# Patient Record
Sex: FEMALE | Race: WHITE | NOT HISPANIC OR LATINO | URBAN - METROPOLITAN AREA
[De-identification: names, ages, dates, MRNs, and addresses within clinical notes are randomized per-mention and may not be internally consistent; named-entity substitution may affect disease eponyms.]

---

## 2017-01-20 ENCOUNTER — OFFICE (OUTPATIENT)
Dept: URBAN - METROPOLITAN AREA CLINIC 11 | Facility: CLINIC | Age: 72
End: 2017-01-20
Payer: MEDICARE

## 2017-01-20 VITALS
DIASTOLIC BLOOD PRESSURE: 83 MMHG | WEIGHT: 158 LBS | SYSTOLIC BLOOD PRESSURE: 147 MMHG | HEIGHT: 62 IN | HEART RATE: 95 BPM

## 2017-01-20 DIAGNOSIS — K58.0 IRRITABLE BOWEL SYNDROME WITH DIARRHEA: ICD-10-CM

## 2017-01-20 PROCEDURE — 99213 OFFICE O/P EST LOW 20 MIN: CPT | Performed by: INTERNAL MEDICINE

## 2017-01-20 PROCEDURE — G8427 DOCREV CUR MEDS BY ELIG CLIN: HCPCS | Performed by: INTERNAL MEDICINE

## 2017-01-20 RX ORDER — CHOLESTYRAMINE 4 G/5G
POWDER, FOR SUSPENSION ORAL
Qty: 90 | Refills: 3 | Status: COMPLETED
Start: 2016-10-05 | End: 2023-10-01

## 2017-01-20 NOTE — SERVICEHPINOTES
She stated that "I am doing great".  She has noted that on the cholestyramine that her diarrhea has stopped aside from three episodes that she related to a stress issue.  She has been able to be consistent wit [facility] emeds.  She has had some issues with constipationn ih the medication but stated that it has not been an issue given the overall improvement.  She has then cut back to 1/2 packet a day.  She has not had bleeding or other acute issues.  She has also not ahd further issues with diverticulosis.She has been spacing her cholestyramine about two hours after the thryoid meds and other meds.  She has seen Dr. Moore and due to age and other medical issues, it was decided to wait until another issue with diverticulitis.

## 2017-01-20 NOTE — SERVICENOTES
She has been doing quite well on the cholestyramine.  We discussed its longterm use and need to keep it separate from other meds.  We discussed that overtime we may be able to stop it.  I would see her back in a year. She has not had issues with diverticulitis and is to call if there are difficulties.

## 2017-07-06 ENCOUNTER — OFFICE (OUTPATIENT)
Dept: URBAN - METROPOLITAN AREA CLINIC 11 | Facility: CLINIC | Age: 72
End: 2017-07-06
Payer: MEDICARE

## 2017-07-06 VITALS
HEIGHT: 62 IN | WEIGHT: 165 LBS | DIASTOLIC BLOOD PRESSURE: 95 MMHG | HEART RATE: 86 BPM | SYSTOLIC BLOOD PRESSURE: 147 MMHG

## 2017-07-06 DIAGNOSIS — E78.70 DISORDER OF BILE ACID AND CHOLESTEROL METABOLISM, UNSPECIFIE: ICD-10-CM

## 2017-07-06 DIAGNOSIS — R19.7 DIARRHEA, UNSPECIFIED: ICD-10-CM

## 2017-07-06 PROCEDURE — G8427 DOCREV CUR MEDS BY ELIG CLIN: HCPCS | Performed by: INTERNAL MEDICINE

## 2017-07-06 PROCEDURE — 99213 OFFICE O/P EST LOW 20 MIN: CPT | Performed by: INTERNAL MEDICINE

## 2017-07-06 RX ORDER — PANCRELIPASE 24000; 76000; 120000 [USP'U]/1; [USP'U]/1; [USP'U]/1
CAPSULE, DELAYED RELEASE PELLETS ORAL
Qty: 270 | Refills: 3 | Status: COMPLETED
Start: 2017-07-06 | End: 2023-10-01

## 2017-07-06 NOTE — SERVICENOTES
We discussed her diarrhea issues and prior difficulties with bile salt malabsorption and prior improvement with the cholestyramine.  We discussed her questions about TV adds and that viberzi is contraindictated with her diarrhea.  We discussed the retrial of the binders but she wanted a different option.  Given the history of pancreatitis, we can try a pancreatic enzyme replacement such as Creon 24s 1 po before breakfast/dinner.

## 2017-07-06 NOTE — SERVICEHPINOTES
She stated that she had been on the cholestryamine and was doig well until about 6 weeks ago.  The her stools became solid hard pellets.  She passed a hard stool and had some bleeding with it.  She stopped the binders and then would have diarrhea.  She tried to start back the cholestyramine at a 1/4 dose and it would again cause constipation. She has since stopped the binders and has restarted having diarrhea again.  The diarrhea has been variable with stools about once a day to once every other day.  She has had some leakage with the diarrhea which has caused difficulties with accidents. She has noted ath eating hard cheeses and crackers that the stools are better.  She has not had further bleeidng issues.  She does not have diarrhea if she does not eat.  Sometimes she will have diarrhea right after eating. She had normal colon bx in 2015 for the diarrhea eval. She has had her gall bladder out and has had a history of pancreatitis. She has had a history of multile adenomas and villous adenomas with her last colonscopy in 2015.  She is due for her f/u colon in the Fall of 2018.

## 2017-10-10 ENCOUNTER — OFFICE (OUTPATIENT)
Dept: URBAN - METROPOLITAN AREA CLINIC 11 | Facility: CLINIC | Age: 72
End: 2017-10-10
Payer: MEDICARE

## 2017-10-10 VITALS
HEIGHT: 62 IN | DIASTOLIC BLOOD PRESSURE: 102 MMHG | WEIGHT: 166 LBS | HEART RATE: 110 BPM | RESPIRATION RATE: 16 BRPM | SYSTOLIC BLOOD PRESSURE: 152 MMHG

## 2017-10-10 DIAGNOSIS — R74.0 NONSPECIFIC ELEVATION OF LEVELS OF TRANSAMINASE AND LACTIC A: ICD-10-CM

## 2017-10-10 DIAGNOSIS — E78.70 DISORDER OF BILE ACID AND CHOLESTEROL METABOLISM, UNSPECIFIE: ICD-10-CM

## 2017-10-10 LAB
ACTIN (SMOOTH MUSCLE) ANTIBODY: (no result) UNITS
ANTINUCLEAR ANTIBODIES, IFA: (no result)
HEPATIC FUNCTION PANEL (7): ALBUMIN, SERUM: (no result)
HEPATIC FUNCTION PANEL (7): ALKALINE PHOSPHATASE, S: (no result)
HEPATIC FUNCTION PANEL (7): ALT (SGPT): (no result)
HEPATIC FUNCTION PANEL (7): AST (SGOT): (no result)
HEPATIC FUNCTION PANEL (7): BILIRUBIN, DIRECT: (no result)
HEPATIC FUNCTION PANEL (7): BILIRUBIN, TOTAL: (no result)
HEPATIC FUNCTION PANEL (7): PROTEIN, TOTAL, SERUM: (no result) G/DL
MITOCHONDRIAL (M2) ANTIBODY: (no result) UNITS
REQUEST PROBLEM: (no result)

## 2017-10-10 PROCEDURE — 99213 OFFICE O/P EST LOW 20 MIN: CPT | Performed by: INTERNAL MEDICINE

## 2017-10-10 PROCEDURE — G8427 DOCREV CUR MEDS BY ELIG CLIN: HCPCS | Performed by: INTERNAL MEDICINE

## 2017-10-10 RX ORDER — PANCRELIPASE 24000; 76000; 120000 [USP'U]/1; [USP'U]/1; [USP'U]/1
CAPSULE, DELAYED RELEASE PELLETS ORAL
Qty: 270 | Refills: 3 | Status: COMPLETED
Start: 2017-07-06 | End: 2023-10-01

## 2017-10-10 NOTE — SERVICENOTES
We discussed her chronic diarrhea and improvement on Creon.  I would increase it to TID as discussed.  We reviewd her LFTs and discussed her etoh use (overall minimal).  I will do f/u LFTS and also screening for PBC/Autoimmune hepatitis.

## 2017-10-10 NOTE — SERVICEHPINOTES
"I'm doing ok."  She stated that had been on the Creon and had improved until her trip to [place].  She has been "awful at eating" while there and was eating fried seafoods.  WIth it she had diarrhea but it was better than with the cholestyramine which also caused some bloating.  She stated that one the Creon and on the lower fat diet she is having about 3-4 soft to formed stools.  She has been taking it 1 prior to meals twice a day. She stated that her LFTS were elevated with Dr. Dietz.   We reviewed her labs in [facility] over the past year. Her ALT has been mild increased to 70 from 50s.  Her AST has been overall normal.  The GGT was mildly elevated in the 120s and Alk phos was mildly elevated as well.

## 2017-10-17 ENCOUNTER — OFFICE (OUTPATIENT)
Dept: URBAN - METROPOLITAN AREA CLINIC 11 | Facility: CLINIC | Age: 72
End: 2017-10-17

## 2017-10-17 DIAGNOSIS — R74.0 NONSPECIFIC ELEVATION OF LEVELS OF TRANSAMINASE AND LACTIC A: ICD-10-CM

## 2017-10-17 LAB
ACTIN (SMOOTH MUSCLE) ANTIBODY: 7 UNITS (ref 0–19)
ANTINUCLEAR ANTIBODIES, IFA: NEGATIVE
HEPATIC FUNCTION PANEL (7): ALBUMIN, SERUM: 4.4 G/DL (ref 3.5–4.8)
HEPATIC FUNCTION PANEL (7): ALKALINE PHOSPHATASE, S: 97 IU/L (ref 39–117)
HEPATIC FUNCTION PANEL (7): ALT (SGPT): 28 IU/L (ref 0–32)
HEPATIC FUNCTION PANEL (7): AST (SGOT): 21 IU/L (ref 0–40)
HEPATIC FUNCTION PANEL (7): BILIRUBIN, DIRECT: 0.13 MG/DL (ref 0–0.4)
HEPATIC FUNCTION PANEL (7): BILIRUBIN, TOTAL: 0.4 MG/DL (ref 0–1.2)
HEPATIC FUNCTION PANEL (7): PROTEIN, TOTAL, SERUM: 6.4 G/DL (ref 6–8.5)
MITOCHONDRIAL (M2) ANTIBODY: 4.5 UNITS (ref 0–20)

## 2018-01-09 ENCOUNTER — OFFICE (OUTPATIENT)
Dept: URBAN - METROPOLITAN AREA CLINIC 11 | Facility: CLINIC | Age: 73
End: 2018-01-09
Payer: MEDICARE

## 2018-01-09 VITALS
HEART RATE: 92 BPM | DIASTOLIC BLOOD PRESSURE: 103 MMHG | HEIGHT: 62 IN | SYSTOLIC BLOOD PRESSURE: 177 MMHG | WEIGHT: 174 LBS

## 2018-01-09 DIAGNOSIS — E78.70 DISORDER OF BILE ACID AND CHOLESTEROL METABOLISM, UNSPECIFIE: ICD-10-CM

## 2018-01-09 DIAGNOSIS — K58.0 IRRITABLE BOWEL SYNDROME WITH DIARRHEA: ICD-10-CM

## 2018-01-09 PROCEDURE — G8427 DOCREV CUR MEDS BY ELIG CLIN: HCPCS | Performed by: INTERNAL MEDICINE

## 2018-01-09 PROCEDURE — 99213 OFFICE O/P EST LOW 20 MIN: CPT | Performed by: INTERNAL MEDICINE

## 2018-01-09 RX ORDER — COLESTIPOL HYDROCHLORIDE 1 G/1
TABLET ORAL
Qty: 180 | Refills: 3 | Status: COMPLETED
Start: 2018-01-09 | End: 2023-10-01

## 2018-01-09 RX ORDER — PANCRELIPASE 24000; 76000; 120000 [USP'U]/1; [USP'U]/1; [USP'U]/1
CAPSULE, DELAYED RELEASE PELLETS ORAL
Qty: 270 | Refills: 3 | Status: COMPLETED
Start: 2017-07-06 | End: 2023-10-01

## 2018-01-09 NOTE — SERVICEHPINOTES
She presents for f/u of her reportedly abnormal LFTS.  She had normal LFTS in October and normal serolgies.  She has not had sx/sx of hepatitis.  She has had her renal cyst drained since our last visit and has felt quite relieved about having it check and drained.She has been on her meds for her diarrhea but had still had issues.  She has had intermittent diarrhea with one of the more recent epidose after eating a hamburger and a few fries from [employer].  She had another episode after eating roast.  She is having about 3-5 stools a day.  The stools are soft and formed but at times liquid every other day or so or if she eats certain foods.  She has taken Imodium which may cause her stools to harden.  If she is traveling she will take an Imodium before leaving and then with eating.  She has been on the Creon. She is not certain that it is helping.   She has been on several IBS meds in the past including Robinul, dicyclomine, and Xifaxan.  She had some improvement a while back with cholestyramine but did not like taking it. BR

## 2018-01-09 NOTE — SERVICENOTES
We dsicussed her chronic diarrhea, prior testing, prior meds, and the trial again of cholestyramine but in tablet form.

## 2018-04-10 ENCOUNTER — OFFICE (OUTPATIENT)
Dept: URBAN - METROPOLITAN AREA CLINIC 11 | Facility: CLINIC | Age: 73
End: 2018-04-10

## 2018-04-10 VITALS
DIASTOLIC BLOOD PRESSURE: 89 MMHG | WEIGHT: 171 LBS | HEIGHT: 62 IN | SYSTOLIC BLOOD PRESSURE: 152 MMHG | HEART RATE: 95 BPM

## 2018-04-10 DIAGNOSIS — E78.70 DISORDER OF BILE ACID AND CHOLESTEROL METABOLISM, UNSPECIFIE: ICD-10-CM

## 2018-04-10 PROCEDURE — 99213 OFFICE O/P EST LOW 20 MIN: CPT | Performed by: INTERNAL MEDICINE

## 2018-04-10 NOTE — SERVICENOTES
She has been doing quite well on the lower carb diet and with the colestid.  She has really liked the improvement. We discussed the normalization of her stools and the need to space the bile binders from her other meds.  With her improvement, I would see her back yearly.

## 2018-04-10 NOTE — SERVICEHPINOTES
She presnets for f/u of her diarrhea.  She stated that on the Colestid and the lower carb diet her diarrhea has stopped.  She is having  a couple of stools a day.  She has has diarrhea twice after eating a "really greasy meal with everything fried".  Shehas not had issues with abdominal pain or n/v or other new issues.

## 2018-10-22 ENCOUNTER — OFFICE (OUTPATIENT)
Dept: URBAN - METROPOLITAN AREA CLINIC 11 | Facility: CLINIC | Age: 73
End: 2018-10-22

## 2018-10-22 VITALS
HEART RATE: 99 BPM | SYSTOLIC BLOOD PRESSURE: 120 MMHG | DIASTOLIC BLOOD PRESSURE: 69 MMHG | WEIGHT: 175 LBS | HEIGHT: 62 IN

## 2018-10-22 DIAGNOSIS — K57.30 DIVERTICULOSIS OF LARGE INTESTINE WITHOUT PERFORATION OR ABS: ICD-10-CM

## 2018-10-22 DIAGNOSIS — Z86.010 PERSONAL HISTORY OF COLONIC POLYPS: ICD-10-CM

## 2018-10-22 DIAGNOSIS — R19.7 DIARRHEA, UNSPECIFIED: ICD-10-CM

## 2018-10-22 PROCEDURE — 99214 OFFICE O/P EST MOD 30 MIN: CPT | Performed by: INTERNAL MEDICINE

## 2018-10-22 RX ORDER — SODIUM SULFATE, POTASSIUM SULFATE, MAGNESIUM SULFATE 17.5; 3.13; 1.6 G/ML; G/ML; G/ML
SOLUTION, CONCENTRATE ORAL
Qty: 1 | Refills: 0 | Status: COMPLETED
Start: 2018-10-22 | End: 2023-10-01

## 2018-10-22 NOTE — SERVICEHPINOTES
She stated that she had diverticulitis in August noted on a CT.  She was treated with antibiotics.  About 4 weeks later she had diarrhea with some abdominal pain while on a cruise in [place].  She was treated again with antibiotics.  She has after that she had "strep throat" and was treated again with antibiotics. She has not had diverticulitis since August.  She has had diarrhea "all of the time".  She has been on the colestid and on two tablets daily she had "balls of stools" with cramps and decreased it to once daily and her diarrhea increased again. She was on the 2 tablets for about 2 moths.She had a 12mm adenoma removed in 2015 and is due for her f/u colon.

## 2018-10-22 NOTE — SERVICENOTES
We discussed her diarrhea and improvement on the binders.  However she has dropped the dose due to the harder stools.  Prior to changing to cholestyramine powder, in that she is due for her f/u colon with her polyp hx, I would do random colon bx at the time as well.  We discussed her diverticulitis, risks of recurrent issues, particular risks of scopes, and that I would wait another 3-4 weeks to do the colonoscopy.  She agreed to proceed.

## 2018-11-12 ENCOUNTER — AMBULATORY SURGICAL CENTER (OUTPATIENT)
Dept: URBAN - METROPOLITAN AREA SURGERY 3 | Facility: SURGERY | Age: 73
End: 2018-11-12

## 2018-11-12 ENCOUNTER — OFFICE (OUTPATIENT)
Dept: URBAN - METROPOLITAN AREA PATHOLOGY 22 | Facility: PATHOLOGY | Age: 73
End: 2018-11-12

## 2018-11-12 ENCOUNTER — AMBULATORY SURGICAL CENTER (OUTPATIENT)
Dept: URBAN - METROPOLITAN AREA SURGERY 3 | Facility: SURGERY | Age: 73
End: 2018-11-12
Payer: MEDICARE

## 2018-11-12 VITALS
WEIGHT: 175 LBS | DIASTOLIC BLOOD PRESSURE: 74 MMHG | HEART RATE: 89 BPM | WEIGHT: 175 LBS | DIASTOLIC BLOOD PRESSURE: 74 MMHG | SYSTOLIC BLOOD PRESSURE: 150 MMHG | OXYGEN SATURATION: 98 % | HEART RATE: 89 BPM | OXYGEN SATURATION: 99 % | HEART RATE: 88 BPM | RESPIRATION RATE: 22 BRPM | HEIGHT: 62 IN | SYSTOLIC BLOOD PRESSURE: 137 MMHG | SYSTOLIC BLOOD PRESSURE: 137 MMHG | DIASTOLIC BLOOD PRESSURE: 97 MMHG | HEART RATE: 88 BPM | RESPIRATION RATE: 20 BRPM | TEMPERATURE: 98.2 F | OXYGEN SATURATION: 98 % | DIASTOLIC BLOOD PRESSURE: 71 MMHG | RESPIRATION RATE: 20 BRPM | OXYGEN SATURATION: 99 % | SYSTOLIC BLOOD PRESSURE: 150 MMHG | SYSTOLIC BLOOD PRESSURE: 152 MMHG | HEART RATE: 98 BPM | DIASTOLIC BLOOD PRESSURE: 73 MMHG | HEART RATE: 95 BPM | OXYGEN SATURATION: 97 % | HEART RATE: 100 BPM | HEIGHT: 62 IN | SYSTOLIC BLOOD PRESSURE: 138 MMHG | SYSTOLIC BLOOD PRESSURE: 154 MMHG | DIASTOLIC BLOOD PRESSURE: 71 MMHG | DIASTOLIC BLOOD PRESSURE: 73 MMHG | SYSTOLIC BLOOD PRESSURE: 152 MMHG | HEART RATE: 95 BPM | HEART RATE: 98 BPM | RESPIRATION RATE: 22 BRPM | TEMPERATURE: 98 F | TEMPERATURE: 98.2 F | RESPIRATION RATE: 19 BRPM | DIASTOLIC BLOOD PRESSURE: 97 MMHG | TEMPERATURE: 98 F | DIASTOLIC BLOOD PRESSURE: 70 MMHG | RESPIRATION RATE: 19 BRPM | DIASTOLIC BLOOD PRESSURE: 70 MMHG | OXYGEN SATURATION: 97 % | SYSTOLIC BLOOD PRESSURE: 138 MMHG | SYSTOLIC BLOOD PRESSURE: 154 MMHG | HEART RATE: 100 BPM

## 2018-11-12 DIAGNOSIS — Z86.010 PERSONAL HISTORY OF COLONIC POLYPS: ICD-10-CM

## 2018-11-12 DIAGNOSIS — D12.3 BENIGN NEOPLASM OF TRANSVERSE COLON: ICD-10-CM

## 2018-11-12 DIAGNOSIS — D12.2 BENIGN NEOPLASM OF ASCENDING COLON: ICD-10-CM

## 2018-11-12 DIAGNOSIS — D12.5 BENIGN NEOPLASM OF SIGMOID COLON: ICD-10-CM

## 2018-11-12 DIAGNOSIS — R19.7 DIARRHEA, UNSPECIFIED: ICD-10-CM

## 2018-11-12 DIAGNOSIS — D12.0 BENIGN NEOPLASM OF CECUM: ICD-10-CM

## 2018-11-12 DIAGNOSIS — K57.30 DIVERTICULOSIS OF LARGE INTESTINE WITHOUT PERFORATION OR ABS: ICD-10-CM

## 2018-11-12 PROCEDURE — G8907 PT DOC NO EVENTS ON DISCHARG: HCPCS | Performed by: INTERNAL MEDICINE

## 2018-11-12 PROCEDURE — 45380 COLONOSCOPY AND BIOPSY: CPT | Performed by: INTERNAL MEDICINE

## 2018-11-12 PROCEDURE — G8918 PT W/O PREOP ORDER IV AB PRO: HCPCS | Performed by: INTERNAL MEDICINE

## 2018-11-12 PROCEDURE — 88305 TISSUE EXAM BY PATHOLOGIST: CPT | Performed by: INTERNAL MEDICINE

## 2018-11-20 ENCOUNTER — OFFICE (OUTPATIENT)
Dept: URBAN - METROPOLITAN AREA CLINIC 11 | Facility: CLINIC | Age: 73
End: 2018-11-20

## 2018-11-20 VITALS — WEIGHT: 176 LBS | HEART RATE: 97 BPM | HEIGHT: 62 IN

## 2018-11-20 DIAGNOSIS — R19.7 DIARRHEA, UNSPECIFIED: ICD-10-CM

## 2018-11-20 PROCEDURE — 99213 OFFICE O/P EST LOW 20 MIN: CPT | Performed by: INTERNAL MEDICINE

## 2018-11-20 RX ORDER — RIFAXIMIN 550 MG/1
TABLET ORAL
Qty: 42 | Refills: 0 | Status: COMPLETED
Start: 2018-11-20 | End: 2023-10-01

## 2018-11-20 NOTE — SERVICENOTES
We discussed her colon polyps and three year f/u colon.  As to her diarrhea, I still suspect that this is related to bacterial overgrowth/IBS with some bile acid component.  We discussed the trial of Xifaxan with the Align.  She has not started her new BP meds and we discussed the need to be consistent with her meds.

## 2018-11-20 NOTE — SERVICEHPINOTES
She presents for f/u of her diarrhea. She stated that after her colon she had normal stools for about 5 days straight.  She was not certain why it had improved.  She had not changed her diet. She has been on Colestid which had stopped her diarrhea for about a year.  She has had normal bx in the past.  She has not been on xifaxan thought it has been rec twice. She recalled that she might have had a dumping syndrome diagnosis years ago with Dr. Kingsley.

## 2019-01-28 ENCOUNTER — OFFICE (OUTPATIENT)
Dept: URBAN - METROPOLITAN AREA CLINIC 11 | Facility: CLINIC | Age: 74
End: 2019-01-28

## 2019-01-28 VITALS
SYSTOLIC BLOOD PRESSURE: 198 MMHG | WEIGHT: 179 LBS | HEART RATE: 86 BPM | DIASTOLIC BLOOD PRESSURE: 103 MMHG | HEIGHT: 62 IN | SYSTOLIC BLOOD PRESSURE: 174 MMHG | DIASTOLIC BLOOD PRESSURE: 131 MMHG

## 2019-01-28 DIAGNOSIS — E66.9 OBESITY, UNSPECIFIED: ICD-10-CM

## 2019-01-28 DIAGNOSIS — R19.7 DIARRHEA, UNSPECIFIED: ICD-10-CM

## 2019-01-28 DIAGNOSIS — K58.0 IRRITABLE BOWEL SYNDROME WITH DIARRHEA: ICD-10-CM

## 2019-01-28 DIAGNOSIS — I10 ESSENTIAL (PRIMARY) HYPERTENSION: ICD-10-CM

## 2019-01-28 PROCEDURE — 99213 OFFICE O/P EST LOW 20 MIN: CPT | Performed by: INTERNAL MEDICINE

## 2019-01-28 NOTE — SERVICENOTES
Her IBS/bacterial overgrowth has improved nicely after the Xifaxan.  We dsicussed the use of the probiotics and stopping them after about a month.  We discussed her htn, sx/sx of htn urgency/emergency, and f/u with Dr. Dietz.  Her f/u BP was 160/98.

## 2019-01-28 NOTE — SERVICEHPINOTES
She presents for f/u of her IBS.  She ahd been on the Xifaxan and while on it all of her symptoms resolved. She stated that off of it she has only had two episode of diarrhea.  Once was after eggs and the other after "alot" of olives.  She has been doing quite well. She has really enjoyed the improvement.  She has been able to a broader variety of foods. She has a history of hypertension.  She had been on meds including an ACE.  She stated that she had been taking her pressures had home for a while.  It has been in the 1502-160s over 80s-90s.   She stated that she had been seeing Dr. Dietz and Dr. Rebolledo.  She has not had cp, sob, anginal sx or such.

## 2019-07-16 ENCOUNTER — OFFICE (OUTPATIENT)
Dept: URBAN - METROPOLITAN AREA CLINIC 11 | Facility: CLINIC | Age: 74
End: 2019-07-16
Payer: COMMERCIAL

## 2019-07-16 VITALS
HEIGHT: 62 IN | WEIGHT: 165 LBS | SYSTOLIC BLOOD PRESSURE: 124 MMHG | HEART RATE: 100 BPM | DIASTOLIC BLOOD PRESSURE: 87 MMHG

## 2019-07-16 DIAGNOSIS — K58.0 IRRITABLE BOWEL SYNDROME WITH DIARRHEA: ICD-10-CM

## 2019-07-16 DIAGNOSIS — E78.70 DISORDER OF BILE ACID AND CHOLESTEROL METABOLISM, UNSPECIFIE: ICD-10-CM

## 2019-07-16 PROCEDURE — 99213 OFFICE O/P EST LOW 20 MIN: CPT | Performed by: INTERNAL MEDICINE

## 2019-07-16 NOTE — SERVICEHPINOTES
She present for f/u of her IBS.  She has completed the Xifaxana and noted an improvement.  She has also started a Keto diet and has lost about 15 lbs.  On the diet she has not had issues with diarrhea, unless she has lobster and butter or greasier foods.  She has been able to stop the cholestyramine as well.  She has not had issues with bloating or abdominal pain.  Overall her stools are normal.

## 2019-07-16 NOTE — SERVICENOTES
She has been doing quite well on the keto diet and after the Xifaxan.  We discussed a lower fat diet with her hx of bile acid issues and a possible restart of the binders if she has issues.  With her improvement, I would see her back in 2021 for her f/u colon (hx of mutiple polyps).

## 2020-01-10 ENCOUNTER — OFFICE (OUTPATIENT)
Dept: URBAN - METROPOLITAN AREA CLINIC 11 | Facility: CLINIC | Age: 75
End: 2020-01-10

## 2020-01-21 ENCOUNTER — OFFICE (OUTPATIENT)
Dept: URBAN - METROPOLITAN AREA CLINIC 11 | Facility: CLINIC | Age: 75
End: 2020-01-21
Payer: COMMERCIAL

## 2020-01-21 VITALS
WEIGHT: 161 LBS | SYSTOLIC BLOOD PRESSURE: 141 MMHG | HEART RATE: 106 BPM | DIASTOLIC BLOOD PRESSURE: 92 MMHG | HEIGHT: 62 IN

## 2020-01-21 DIAGNOSIS — R10.30 LOWER ABDOMINAL PAIN, UNSPECIFIED: ICD-10-CM

## 2020-01-21 DIAGNOSIS — R10.813 RIGHT LOWER QUADRANT ABDOMINAL TENDERNESS: ICD-10-CM

## 2020-01-21 DIAGNOSIS — A09 INFECTIOUS GASTROENTERITIS AND COLITIS, UNSPECIFIED: ICD-10-CM

## 2020-01-21 DIAGNOSIS — R10.814 LEFT LOWER QUADRANT ABDOMINAL TENDERNESS: ICD-10-CM

## 2020-01-21 LAB
CBC, PLATELET, NO DIFFERENTIAL: HEMATOCRIT: 45.2 % (ref 34–46.6)
CBC, PLATELET, NO DIFFERENTIAL: HEMOGLOBIN: 14.8 G/DL (ref 11.1–15.9)
CBC, PLATELET, NO DIFFERENTIAL: MCH: 29 PG (ref 26.6–33)
CBC, PLATELET, NO DIFFERENTIAL: MCHC: 32.7 G/DL (ref 31.5–35.7)
CBC, PLATELET, NO DIFFERENTIAL: MCV: 89 FL (ref 79–97)
CBC, PLATELET, NO DIFFERENTIAL: PLATELETS: 281 X10E3/UL (ref 150–450)
CBC, PLATELET, NO DIFFERENTIAL: RBC: 5.11 X10E6/UL (ref 3.77–5.28)
CBC, PLATELET, NO DIFFERENTIAL: RDW: 13.5 % (ref 11.7–15.4)
CBC, PLATELET, NO DIFFERENTIAL: WBC: 5.8 X10E3/UL (ref 3.4–10.8)
COMP. METABOLIC PANEL (14): A/G RATIO: 2.3 — HIGH (ref 1.2–2.2)
COMP. METABOLIC PANEL (14): ALBUMIN: 4.6 G/DL (ref 3.7–4.7)
COMP. METABOLIC PANEL (14): ALKALINE PHOSPHATASE: 110 IU/L (ref 39–117)
COMP. METABOLIC PANEL (14): ALT (SGPT): 17 IU/L (ref 0–32)
COMP. METABOLIC PANEL (14): AST (SGOT): 14 IU/L (ref 0–40)
COMP. METABOLIC PANEL (14): BILIRUBIN, TOTAL: 0.3 MG/DL (ref 0–1.2)
COMP. METABOLIC PANEL (14): BUN/CREATININE RATIO: 22 (ref 12–28)
COMP. METABOLIC PANEL (14): BUN: 18 MG/DL (ref 8–27)
COMP. METABOLIC PANEL (14): CALCIUM: 10 MG/DL (ref 8.7–10.3)
COMP. METABOLIC PANEL (14): CARBON DIOXIDE, TOTAL: 23 MMOL/L (ref 20–29)
COMP. METABOLIC PANEL (14): CHLORIDE: 104 MMOL/L (ref 96–106)
COMP. METABOLIC PANEL (14): CREATININE: 0.83 MG/DL (ref 0.57–1)
COMP. METABOLIC PANEL (14): EGFR IF AFRICN AM: 79 ML/MIN/1.73 (ref 59–?)
COMP. METABOLIC PANEL (14): EGFR IF NONAFRICN AM: 69 ML/MIN/1.73 (ref 59–?)
COMP. METABOLIC PANEL (14): GLOBULIN, TOTAL: 2 G/DL (ref 1.5–4.5)
COMP. METABOLIC PANEL (14): GLUCOSE: 113 MG/DL — HIGH (ref 65–99)
COMP. METABOLIC PANEL (14): POTASSIUM: 5 MMOL/L (ref 3.5–5.2)
COMP. METABOLIC PANEL (14): PROTEIN, TOTAL: 6.6 G/DL (ref 6–8.5)
COMP. METABOLIC PANEL (14): SODIUM: 142 MMOL/L (ref 134–144)

## 2020-01-21 PROCEDURE — 99214 OFFICE O/P EST MOD 30 MIN: CPT | Performed by: INTERNAL MEDICINE

## 2020-01-21 RX ORDER — HYOSCYAMINE SULFATE 0.12 MG/1
0.38 TABLET, ORALLY DISINTEGRATING ORAL
Qty: 30 | Refills: 1 | Status: COMPLETED
Start: 2020-01-21 | End: 2023-10-01

## 2020-01-21 NOTE — SERVICENOTES
We discussed her recent diarrheal issue and that this sounds infectious.  We discussed potential sources including the egg nog and several infectious agents including salmonella, shigella, rotovirus, astrovirus, campylobacter, etc...  She has improved but is  on exam.  I would lie to do a CT to evaluated for diverticulitis/colitis, etc... I would hold on additional antibitoics for now.  With her hx of bile acid diarrhea, after this resolves, she may need to go back on binders.

## 2020-01-21 NOTE — SERVICEHPINOTES
She presents for evaluation of diarrhea with abdominal pain after New Years.  She had some home made egg nog and other New Years foods prior to the diarrhea.  She stated that for about 6 days that she had a low pelvic pain which was sharp. This has improved to an ache.  She has not had fevers but did have nausea with one episode of vomiting.  During this time she had runny stools wtih some mucus.  She had some bright blood once after a diarrhea episode. She was having about 3-5 stools a day during the diarrhea.  Her stools have been returning to normal. She has all most completed her antibiotics. She had thought that this was diverticulitis.  She had been on cholestyramine last year but was off of it from July until now.  She did notice some intermittent diarrhea after Christmas which was different than the diarrhea after New Years.

## 2020-02-14 ENCOUNTER — OFFICE (OUTPATIENT)
Dept: URBAN - METROPOLITAN AREA CLINIC 11 | Facility: CLINIC | Age: 75
End: 2020-02-14
Payer: COMMERCIAL

## 2020-02-14 VITALS
HEART RATE: 91 BPM | HEIGHT: 62 IN | WEIGHT: 165 LBS | SYSTOLIC BLOOD PRESSURE: 143 MMHG | DIASTOLIC BLOOD PRESSURE: 81 MMHG

## 2020-02-14 DIAGNOSIS — K58.0 IRRITABLE BOWEL SYNDROME WITH DIARRHEA: ICD-10-CM

## 2020-02-14 DIAGNOSIS — E78.70 DISORDER OF BILE ACID AND CHOLESTEROL METABOLISM, UNSPECIFIE: ICD-10-CM

## 2020-02-14 PROCEDURE — 99213 OFFICE O/P EST LOW 20 MIN: CPT | Performed by: INTERNAL MEDICINE

## 2020-02-14 RX ORDER — COLESTIPOL HYDROCHLORIDE 1 G/1
2 TABLET ORAL
Qty: 180 | Refills: 3 | Status: COMPLETED
Start: 2020-02-14 | End: 2023-10-01

## 2020-02-14 NOTE — SERVICEHPINOTES
She presents for f/u of her diarrhea with the hx of bile acid diarrhea and IBS.  She had been off of the binders since July and has had diarrhea since December.  She stated that she has had 5 days without diarrhea over the past month or so.  She stated that the diarrhea is somewhat random.  She may have upto 5 stools a day but somtimes only a couple.  The stools are urgent and watery.  She has not had blood in her stools.  She has had some accidents with them.  She had tried the Levsin which helps but she has not taken it consistently.  She has had negative stools.  she has tried dietary changes but has not seen an improvement. She had improved nicely in the past with the binders.

## 2020-02-14 NOTE — SERVICENOTES
We discussed her diarrhea and I suspect that this is a recurrent bile acid issue.  We discussed restarting the binders and how to use them.  Her stools have been negative.  If this continues we can do an FSC with bx for microscopic colitis.  I would like to see her back prior to her trip to [place] in April.

## 2020-05-22 ENCOUNTER — OFFICE (OUTPATIENT)
Dept: URBAN - METROPOLITAN AREA TELEHEALTH 10 | Facility: TELEHEALTH | Age: 75
End: 2020-05-22
Payer: COMMERCIAL

## 2020-05-22 VITALS — HEIGHT: 62 IN

## 2020-05-22 DIAGNOSIS — R19.7 DIARRHEA, UNSPECIFIED: ICD-10-CM

## 2020-05-22 DIAGNOSIS — K59.00 CONSTIPATION, UNSPECIFIED: ICD-10-CM

## 2020-05-22 DIAGNOSIS — E78.70 DISORDER OF BILE ACID AND CHOLESTEROL METABOLISM, UNSPECIFIE: ICD-10-CM

## 2020-05-22 NOTE — SERVICENOTES
We discussed the recent issue with constipation which seems to be related to the large amount of strawberries she had been eating.  Her symptoms have resolved and we can restart her Creon which had been working well for her chronic diarrhea. We discussed her cough/fevers and that if she started having breathing issues she may need to go to the ER.  She has COVID testing pending and f/u with Dr. Dietz.

Call 15:10.

## 2020-05-22 NOTE — SERVICEHPINOTES
She stated that she has had a tough week and was tested for COVID this morning after having a fever and sore throat with a cough on Wednesday.  She was also stressed having to download the fidel for PmD and setting up a password.  She has a hx of chronic diarrhea and is taking Creon.  She had c/o about the cost of 100 dollars every three months.  She stated that it had been working well for her.  However last weekend she started having constipation after eating "a lot" of strawberries.  Afterward, she did not have a stool afterward until last night when she had three hard "marble" stools.  She then had a large stool today and her symptoms of abdominal pain and distention resolved.  She stated that other than the strawberries, she has not had a particular diet change or medication change.

## 2020-11-25 VITALS — HEIGHT: 62 IN

## 2020-12-15 ENCOUNTER — OFFICE (OUTPATIENT)
Dept: URBAN - METROPOLITAN AREA TELEHEALTH 10 | Facility: TELEHEALTH | Age: 75
End: 2020-12-15
Payer: COMMERCIAL

## 2020-12-15 DIAGNOSIS — E78.70 DISORDER OF BILE ACID AND CHOLESTEROL METABOLISM, UNSPECIFIE: ICD-10-CM

## 2020-12-15 DIAGNOSIS — K21.9 GASTRO-ESOPHAGEAL REFLUX DISEASE WITHOUT ESOPHAGITIS: ICD-10-CM

## 2020-12-15 DIAGNOSIS — K57.32 DIVERTICULITIS OF LARGE INTESTINE WITHOUT PERFORATION OR ABS: ICD-10-CM

## 2020-12-15 NOTE — SERVICENOTES
We discussed her diverticulitis issues and at some point she may need a surgical consultation. If she continues to have UTI, she may need an eval for a fistula though by hx she does not appear to currently have one.  We discussed that for now, we would not do a surgical eval wtih the current COVID state. We discussed her low residue diet plan.  Her dietician appts are not covered. Her diarrhea has resolved for now.  With her reflux, she has not had thrush (not on video) and I would add Prilosec for a while with what may be a limited issue with reflux.  

Call 17:09

## 2020-12-15 NOTE — SERVICEHPINOTES
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 She was recently in the hospital for diverticulitis   (Thanksgiving week).  She was admitted to [facility] for four days and treated with antibiotics. She has completed her antibiotics and is now being treated for a UTI. She had been treated for diverticulitis outpt about 3 weeks prior to her admission.  She has improved since her tx and has been on a low residue diet.  She stated that she had not f/u with us for her diverticulitsi earlier because she was concerned that we would rec surgery.  She is currently doing better and her abdominal pain has resolved.  She has not liked her low residue diet.   She stated that she has been having normal stools. She has been having difficulties with nocturnal regurgitation with some burning.  She stated this has been a recent change.  She has not had odynophagia or dysphagia.  She has not had thrush.  She has only had sx at night.

## 2021-03-16 ENCOUNTER — OFFICE (OUTPATIENT)
Dept: URBAN - METROPOLITAN AREA CLINIC 11 | Facility: CLINIC | Age: 76
End: 2021-03-16
Payer: COMMERCIAL

## 2021-03-16 VITALS
WEIGHT: 182 LBS | DIASTOLIC BLOOD PRESSURE: 102 MMHG | SYSTOLIC BLOOD PRESSURE: 180 MMHG | OXYGEN SATURATION: 97 % | HEIGHT: 62 IN | HEART RATE: 81 BPM

## 2021-03-16 DIAGNOSIS — K57.30 DIVERTICULOSIS OF LARGE INTESTINE WITHOUT PERFORATION OR ABS: ICD-10-CM

## 2021-03-16 DIAGNOSIS — Z86.010 PERSONAL HISTORY OF COLONIC POLYPS: ICD-10-CM

## 2021-03-16 DIAGNOSIS — R19.7 DIARRHEA, UNSPECIFIED: ICD-10-CM

## 2021-03-16 DIAGNOSIS — K21.9 GASTRO-ESOPHAGEAL REFLUX DISEASE WITHOUT ESOPHAGITIS: ICD-10-CM

## 2021-03-16 PROCEDURE — 99214 OFFICE O/P EST MOD 30 MIN: CPT | Performed by: INTERNAL MEDICINE

## 2021-03-16 NOTE — SERVICEHPINOTES
She presents for f/u after her diverticulitis from November.  She stated that she has been eating well but mostly carbs.  She has not had further issues with abdominal pain or symptoms of recurrent diverticulitis.  She had a CT in January that revealed diverticulosis not acute changes.Her last colonoscopy was in 2018 with several adenomas.   She has had some issues with diarrhea intermittently.  She had it this morning after eating a hot dog for breakfast.  She has also had it on occasion after eggs, lettuce, tomatoes and cereal with milk. She has not had issues with cheeses or other dairy products.  The diarrhea has been intermittent and she has diarrhea about 1-2 times a week. In between diarrhea spells, she have small "knots" as her stools about once daily.  She has not had skips of days without a stool. She had tried Align but did not think it helped.  She is not currently taking a fiber supplement.She has had good control of her reflux on her PPIs.

## 2021-03-16 NOTE — SERVICENOTES
She has improved and has not had further diverticulitis symptoms.  We reviewed her Ct reports and hospital reports.  She has had some diarrhea difficulties with a hx of bile acid diarrhea and IBS.  I would have her try Benefiber prior to other meds given the intermittent nature of the diarrhea (she has been lactose free).  We discussed her hx of polyps and her f/u colonoscopy. If she is still having diarrhea, then, we can do bx for microscopic colitis.  Her reflux has been stable on meds.

## 2022-02-28 ENCOUNTER — OFFICE (OUTPATIENT)
Dept: URBAN - METROPOLITAN AREA CLINIC 11 | Facility: CLINIC | Age: 77
End: 2022-02-28
Payer: COMMERCIAL

## 2022-02-28 VITALS
DIASTOLIC BLOOD PRESSURE: 81 MMHG | HEIGHT: 62 IN | WEIGHT: 181 LBS | OXYGEN SATURATION: 99 % | HEART RATE: 105 BPM | SYSTOLIC BLOOD PRESSURE: 141 MMHG

## 2022-02-28 DIAGNOSIS — R19.7 DIARRHEA, UNSPECIFIED: ICD-10-CM

## 2022-02-28 DIAGNOSIS — Z86.010 PERSONAL HISTORY OF COLONIC POLYPS: ICD-10-CM

## 2022-02-28 LAB
CBC, PLATELET, NO DIFFERENTIAL: HEMATOCRIT: 46.4 % (ref 34–46.6)
CBC, PLATELET, NO DIFFERENTIAL: HEMOGLOBIN: 15.1 G/DL (ref 11.1–15.9)
CBC, PLATELET, NO DIFFERENTIAL: MCH: 28.2 PG (ref 26.6–33)
CBC, PLATELET, NO DIFFERENTIAL: MCHC: 32.5 G/DL (ref 31.5–35.7)
CBC, PLATELET, NO DIFFERENTIAL: MCV: 87 FL (ref 79–97)
CBC, PLATELET, NO DIFFERENTIAL: PLATELETS: 234 X10E3/UL (ref 150–450)
CBC, PLATELET, NO DIFFERENTIAL: RBC: 5.36 X10E6/UL — HIGH (ref 3.77–5.28)
CBC, PLATELET, NO DIFFERENTIAL: RDW: 13 % (ref 11.7–15.4)
CBC, PLATELET, NO DIFFERENTIAL: WBC: 8.4 X10E3/UL (ref 3.4–10.8)
COMP. METABOLIC PANEL (14): A/G RATIO: 2.7 — HIGH (ref 1.2–2.2)
COMP. METABOLIC PANEL (14): ALBUMIN: 4.8 G/DL — HIGH (ref 3.7–4.7)
COMP. METABOLIC PANEL (14): ALKALINE PHOSPHATASE: 122 IU/L — HIGH (ref 44–121)
COMP. METABOLIC PANEL (14): ALT (SGPT): 36 IU/L — HIGH (ref 0–32)
COMP. METABOLIC PANEL (14): AST (SGOT): 32 IU/L (ref 0–40)
COMP. METABOLIC PANEL (14): BILIRUBIN, TOTAL: 0.4 MG/DL (ref 0–1.2)
COMP. METABOLIC PANEL (14): BUN/CREATININE RATIO: 21 (ref 12–28)
COMP. METABOLIC PANEL (14): BUN: 18 MG/DL (ref 8–27)
COMP. METABOLIC PANEL (14): CALCIUM: 9.8 MG/DL (ref 8.7–10.3)
COMP. METABOLIC PANEL (14): CARBON DIOXIDE, TOTAL: 20 MMOL/L (ref 20–29)
COMP. METABOLIC PANEL (14): CHLORIDE: 105 MMOL/L (ref 96–106)
COMP. METABOLIC PANEL (14): CREATININE: 0.87 MG/DL (ref 0.57–1)
COMP. METABOLIC PANEL (14): EGFR: 68 ML/MIN/1.73 (ref 59–?)
COMP. METABOLIC PANEL (14): GLOBULIN, TOTAL: 1.8 G/DL (ref 1.5–4.5)
COMP. METABOLIC PANEL (14): GLUCOSE: 118 MG/DL — HIGH (ref 65–99)
COMP. METABOLIC PANEL (14): POTASSIUM: 4.7 MMOL/L (ref 3.5–5.2)
COMP. METABOLIC PANEL (14): PROTEIN, TOTAL: 6.6 G/DL (ref 6–8.5)
COMP. METABOLIC PANEL (14): SODIUM: 142 MMOL/L (ref 134–144)

## 2022-02-28 PROCEDURE — 99214 OFFICE O/P EST MOD 30 MIN: CPT | Performed by: INTERNAL MEDICINE

## 2022-02-28 NOTE — SERVICEHPINOTES
"This diarrhea is really getting me."  She stated that the diarrhea has been interferring with her life.  She has had to stop doing some social activities and going out to eat because of the diarrhea. The diarrhea has been variable.  Sometimes she will have several watery stools a day and have accidents.  This can be associated with some abdominal pain/cramping. She may have "two accidents a day" and 7-8 stools total daily.  She has not been using weight.  She has particular issues with diarrhea after eating aside form peanut butter sandwiches and baked potatoes.  She has been taking "shit loads of imodium".  
abhishek weiss She has had prior issues with diarrhea and improved in 2019 after Xifaxan. abhishek weiss She has a hx of appendiceal carcinoid with surgeries years ago. abhishek weiss She has had issues with hypercholesterolemia and has been avoiding meats and alcohol.   
abhishek weiss   She was to have had a colonoscopy last year for diarrhea but did not follow up.

## 2022-02-28 NOTE — SERVICENOTES
We discussed her hx of polyps and need for her f/u colonoscopy.  Additionally she has had persistent diarrhea and will also need evaluation by stools/labs/bx.  We discussed a dif dx including ileal brake syndrome, microscopic colitis, IBD, dietary issues, recurrent bacterial overgrowth, recurrent Carcinoid (but not flushing or other sx/sx)...  We discussed the r/b/a to colonoscopy and she agreed to proceed.  If all is negative, she may need  a CT and urinary studies for carcinoid.

## 2022-03-01 ENCOUNTER — OFFICE (OUTPATIENT)
Dept: URBAN - METROPOLITAN AREA CLINIC 11 | Facility: CLINIC | Age: 77
End: 2022-03-01

## 2022-03-09 ENCOUNTER — OFFICE (OUTPATIENT)
Dept: URBAN - METROPOLITAN AREA PATHOLOGY 22 | Facility: PATHOLOGY | Age: 77
End: 2022-03-09
Payer: COMMERCIAL

## 2022-03-09 ENCOUNTER — AMBULATORY SURGICAL CENTER (OUTPATIENT)
Dept: URBAN - METROPOLITAN AREA SURGERY 3 | Facility: SURGERY | Age: 77
End: 2022-03-09
Payer: COMMERCIAL

## 2022-03-09 VITALS
HEART RATE: 89 BPM | OXYGEN SATURATION: 98 % | DIASTOLIC BLOOD PRESSURE: 63 MMHG | HEIGHT: 62 IN | OXYGEN SATURATION: 94 % | OXYGEN SATURATION: 94 % | DIASTOLIC BLOOD PRESSURE: 81 MMHG | RESPIRATION RATE: 19 BRPM | RESPIRATION RATE: 18 BRPM | SYSTOLIC BLOOD PRESSURE: 138 MMHG | SYSTOLIC BLOOD PRESSURE: 146 MMHG | RESPIRATION RATE: 16 BRPM | DIASTOLIC BLOOD PRESSURE: 81 MMHG | RESPIRATION RATE: 19 BRPM | RESPIRATION RATE: 18 BRPM | HEART RATE: 99 BPM | HEIGHT: 62 IN | SYSTOLIC BLOOD PRESSURE: 143 MMHG | HEART RATE: 91 BPM | HEART RATE: 99 BPM | HEART RATE: 91 BPM | TEMPERATURE: 97.2 F | DIASTOLIC BLOOD PRESSURE: 73 MMHG | HEIGHT: 62 IN | DIASTOLIC BLOOD PRESSURE: 63 MMHG | DIASTOLIC BLOOD PRESSURE: 81 MMHG | RESPIRATION RATE: 20 BRPM | DIASTOLIC BLOOD PRESSURE: 87 MMHG | WEIGHT: 180 LBS | DIASTOLIC BLOOD PRESSURE: 73 MMHG | HEART RATE: 92 BPM | SYSTOLIC BLOOD PRESSURE: 143 MMHG | RESPIRATION RATE: 18 BRPM | TEMPERATURE: 97.2 F | DIASTOLIC BLOOD PRESSURE: 60 MMHG | OXYGEN SATURATION: 95 % | RESPIRATION RATE: 16 BRPM | TEMPERATURE: 97.1 F | SYSTOLIC BLOOD PRESSURE: 146 MMHG | OXYGEN SATURATION: 94 % | HEART RATE: 99 BPM | TEMPERATURE: 97.1 F | SYSTOLIC BLOOD PRESSURE: 125 MMHG | SYSTOLIC BLOOD PRESSURE: 138 MMHG | SYSTOLIC BLOOD PRESSURE: 125 MMHG | DIASTOLIC BLOOD PRESSURE: 87 MMHG | RESPIRATION RATE: 16 BRPM | DIASTOLIC BLOOD PRESSURE: 87 MMHG | OXYGEN SATURATION: 98 % | RESPIRATION RATE: 19 BRPM | RESPIRATION RATE: 20 BRPM | OXYGEN SATURATION: 98 % | HEART RATE: 92 BPM | DIASTOLIC BLOOD PRESSURE: 60 MMHG | SYSTOLIC BLOOD PRESSURE: 138 MMHG | WEIGHT: 180 LBS | OXYGEN SATURATION: 95 % | WEIGHT: 180 LBS | SYSTOLIC BLOOD PRESSURE: 146 MMHG | SYSTOLIC BLOOD PRESSURE: 158 MMHG | TEMPERATURE: 97.1 F | SYSTOLIC BLOOD PRESSURE: 158 MMHG | SYSTOLIC BLOOD PRESSURE: 158 MMHG | HEART RATE: 92 BPM | RESPIRATION RATE: 20 BRPM | DIASTOLIC BLOOD PRESSURE: 73 MMHG | SYSTOLIC BLOOD PRESSURE: 125 MMHG | SYSTOLIC BLOOD PRESSURE: 143 MMHG | HEART RATE: 89 BPM | DIASTOLIC BLOOD PRESSURE: 63 MMHG | TEMPERATURE: 97.2 F | OXYGEN SATURATION: 95 % | HEART RATE: 89 BPM | DIASTOLIC BLOOD PRESSURE: 60 MMHG | HEART RATE: 91 BPM

## 2022-03-09 DIAGNOSIS — Z86.010 PERSONAL HISTORY OF COLONIC POLYPS: ICD-10-CM

## 2022-03-09 DIAGNOSIS — K57.30 DIVERTICULOSIS OF LARGE INTESTINE WITHOUT PERFORATION OR ABS: ICD-10-CM

## 2022-03-09 DIAGNOSIS — D12.5 BENIGN NEOPLASM OF SIGMOID COLON: ICD-10-CM

## 2022-03-09 DIAGNOSIS — R19.7 DIARRHEA, UNSPECIFIED: ICD-10-CM

## 2022-03-09 PROBLEM — K63.5 POLYP OF COLON: Status: ACTIVE | Noted: 2022-03-09

## 2022-03-09 PROCEDURE — G8918 PT W/O PREOP ORDER IV AB PRO: HCPCS | Performed by: INTERNAL MEDICINE

## 2022-03-09 PROCEDURE — 45380 COLONOSCOPY AND BIOPSY: CPT | Mod: 59 | Performed by: INTERNAL MEDICINE

## 2022-03-09 PROCEDURE — 88342 IMHCHEM/IMCYTCHM 1ST ANTB: CPT | Mod: 59 | Performed by: STUDENT IN AN ORGANIZED HEALTH CARE EDUCATION/TRAINING PROGRAM

## 2022-03-09 PROCEDURE — 88305 TISSUE EXAM BY PATHOLOGIST: CPT | Performed by: STUDENT IN AN ORGANIZED HEALTH CARE EDUCATION/TRAINING PROGRAM

## 2022-03-09 PROCEDURE — 45385 COLONOSCOPY W/LESION REMOVAL: CPT | Performed by: INTERNAL MEDICINE

## 2022-03-29 ENCOUNTER — OFFICE (OUTPATIENT)
Dept: URBAN - METROPOLITAN AREA CLINIC 11 | Facility: CLINIC | Age: 77
End: 2022-03-29

## 2022-04-01 ENCOUNTER — OFFICE (OUTPATIENT)
Dept: URBAN - METROPOLITAN AREA CLINIC 22 | Facility: CLINIC | Age: 77
End: 2022-04-01
Payer: COMMERCIAL

## 2022-04-01 ENCOUNTER — OFFICE (OUTPATIENT)
Dept: URBAN - METROPOLITAN AREA CLINIC 11 | Facility: CLINIC | Age: 77
End: 2022-04-01

## 2022-04-01 DIAGNOSIS — N28.1 CYST OF KIDNEY, ACQUIRED: ICD-10-CM

## 2022-04-01 PROCEDURE — 74177 CT ABD & PELVIS W/CONTRAST: CPT | Mod: TC | Performed by: INTERNAL MEDICINE

## 2022-04-29 ENCOUNTER — OFFICE (OUTPATIENT)
Dept: URBAN - METROPOLITAN AREA CLINIC 11 | Facility: CLINIC | Age: 77
End: 2022-04-29
Payer: COMMERCIAL

## 2022-04-29 VITALS
HEIGHT: 62 IN | WEIGHT: 178 LBS | OXYGEN SATURATION: 98 % | DIASTOLIC BLOOD PRESSURE: 83 MMHG | SYSTOLIC BLOOD PRESSURE: 139 MMHG | HEART RATE: 85 BPM

## 2022-04-29 DIAGNOSIS — E78.70 DISORDER OF BILE ACID AND CHOLESTEROL METABOLISM, UNSPECIFIE: ICD-10-CM

## 2022-04-29 DIAGNOSIS — K58.0 IRRITABLE BOWEL SYNDROME WITH DIARRHEA: ICD-10-CM

## 2022-04-29 PROCEDURE — 99213 OFFICE O/P EST LOW 20 MIN: CPT | Performed by: NURSE PRACTITIONER

## 2022-04-29 RX ORDER — COLESTIPOL HYDROCHLORIDE 1 G/1
TABLET ORAL
Qty: 120 | Refills: 3 | Status: COMPLETED
Start: 2022-03-16 | End: 2023-10-01

## 2022-04-29 NOTE — SERVICENOTES
We discussed her continued diarrhea but mild improvement since starting Colestid. We will increase to 2 tablets per day and increase incrementally. We also discussed avoidance of dairy for a short term to assess if symptoms improve.

## 2022-04-29 NOTE — SERVICEHPINOTES
Ms. Warren is a 77 year old female that presents today for follow up. She notes that she has seen some improvement since starting Colestid. She has a bowel movement 2-5 times per day. She notes that she will take Imodium as needed prior to leaving her house for social events. On a regular basis stools are loose and brown at this time. She notes that she had an episode of diarrhea today with three bouts of liquid stool this morning after breakfast. She ate oatmeal and drank coffee with creamer this morning. She recently started on Xifaxan and is currently still taking this medication.br
abhishek  She followed up with urology in regards to the cyst on the kidney. She recommended no further interventions. She was also prescribed something for incontinence.

## 2022-06-14 ENCOUNTER — OFFICE (OUTPATIENT)
Dept: URBAN - METROPOLITAN AREA CLINIC 11 | Facility: CLINIC | Age: 77
End: 2022-06-14
Payer: COMMERCIAL

## 2022-06-14 VITALS
OXYGEN SATURATION: 96 % | WEIGHT: 175 LBS | HEART RATE: 81 BPM | HEIGHT: 62 IN | DIASTOLIC BLOOD PRESSURE: 80 MMHG | SYSTOLIC BLOOD PRESSURE: 138 MMHG

## 2022-06-14 DIAGNOSIS — R19.7 DIARRHEA, UNSPECIFIED: ICD-10-CM

## 2022-06-14 PROCEDURE — 99213 OFFICE O/P EST LOW 20 MIN: CPT | Performed by: NURSE PRACTITIONER

## 2022-06-14 NOTE — SERVICEHPINOTES
Ms. Warren is a 77 year old female that presents today for follow up of diarrhea. She has been doing much better since increasing her Colestid to 2 tablets once a day. She is currently having a bowel movement once a day. Stools are formed and soft. She will sometimes have to decrease to 1 tablet per day d/t a hard stool. She has only had one instance of diarrhea when she was on vacation that improved after Imodium. She denies abdominal pain, hematochezia and melena.

## 2022-06-14 NOTE — SERVICENOTES
She has done very well with her diarrhea with an increase in the Colestid. We will continue the current dose and she will call if she has any trouble before her next follow up appointment.

## 2023-01-24 ENCOUNTER — OFFICE (OUTPATIENT)
Dept: URBAN - METROPOLITAN AREA CLINIC 11 | Facility: CLINIC | Age: 78
End: 2023-01-24
Payer: COMMERCIAL

## 2023-01-24 VITALS
OXYGEN SATURATION: 97 % | DIASTOLIC BLOOD PRESSURE: 95 MMHG | HEART RATE: 102 BPM | WEIGHT: 173 LBS | HEIGHT: 62 IN | SYSTOLIC BLOOD PRESSURE: 154 MMHG

## 2023-01-24 DIAGNOSIS — R13.19 OTHER DYSPHAGIA: ICD-10-CM

## 2023-01-24 DIAGNOSIS — E78.70 DISORDER OF BILE ACID AND CHOLESTEROL METABOLISM, UNSPECIFIE: ICD-10-CM

## 2023-01-24 DIAGNOSIS — K21.9 GASTRO-ESOPHAGEAL REFLUX DISEASE WITHOUT ESOPHAGITIS: ICD-10-CM

## 2023-01-24 PROCEDURE — 99214 OFFICE O/P EST MOD 30 MIN: CPT | Performed by: INTERNAL MEDICINE

## 2023-01-24 NOTE — SERVICEHPINOTES
"I came for a check up."  She stated that if she takes two colestid a day she has had issues with small round hard to pass stools.  She will switch to one pill daily and then after a few days of normal stools she will start having diarrhea. The stools will be loose with about 2-3 stools a day.  She will go back to two pills daily and improved again for a while, may be 3-4 days or so.  She has thought that bread might make her more constipated but she was not certain.
abhishek Childers stated that she has been cooking more at home with some more spicy foods and that she has had more reflux and regurgitation with upper abdominal burning.  She has been taking Advil intermittently, "not often". She has been using Tums and taking her Prilosec.  She has been having issues with dysphagia to solid foods but thought that she was regurgitating more water as well.

## 2023-01-24 NOTE — SERVICENOTES
As to her bile acid diarrhea issues, we discussed the changes in her meds as above with alternating 1-2 pill dosing.  With her increase in her reflux and now issues with dysphagia and some abdominal pain, we discussed doing an EGD to r/o ulcer disease, strictures, rings, etc... and the increase in her meds to BID Prilosec.

## 2023-08-29 ENCOUNTER — OFFICE (OUTPATIENT)
Dept: URBAN - METROPOLITAN AREA CLINIC 11 | Facility: CLINIC | Age: 78
End: 2023-08-29
Payer: COMMERCIAL

## 2023-08-29 VITALS
WEIGHT: 173 LBS | SYSTOLIC BLOOD PRESSURE: 130 MMHG | HEIGHT: 62 IN | OXYGEN SATURATION: 95 % | HEART RATE: 82 BPM | DIASTOLIC BLOOD PRESSURE: 69 MMHG

## 2023-08-29 DIAGNOSIS — K21.9 GASTRO-ESOPHAGEAL REFLUX DISEASE WITHOUT ESOPHAGITIS: ICD-10-CM

## 2023-08-29 DIAGNOSIS — K92.1 MELENA: ICD-10-CM

## 2023-08-29 PROCEDURE — 99214 OFFICE O/P EST MOD 30 MIN: CPT | Performed by: NURSE PRACTITIONER

## 2023-08-29 RX ORDER — OMEPRAZOLE 40 MG/1
80 CAPSULE, DELAYED RELEASE ORAL
Qty: 60 | Refills: 5 | Status: COMPLETED
Start: 2023-08-29 | End: 2023-10-01

## 2023-08-29 NOTE — SERVICEHPINOTES
Ms. Warren presents today for black stools. This is present every time she has a bowel movement. She notes that she has a bowel movement every other day. Stools are hard small balls. She is no longer taking Colestid which she stopped a month ago. She notes that she has had progressive weakness for the past 6 months. She notes that she has also had GREER recently. She continues to take Omeprazole daily which controls her GERD pretty well. She denies breakthrough as long as she avoids aggravating foods such as acidic foods. 
br
br Most recent labs on 8/23/23 Hct 30.4, Hgb 8.9 
br
br   Last LO in 2022 with presence of 1 adenoma

## 2023-08-29 NOTE — SERVICENOTES
We reviewed her recent onset of black stool with anemia and potential differentials including ulcerations, erosions etc. I would like to increase her Omeprazole to BID and plan for EGD at this time. Discussed the procedure including r/b/a.

## 2023-09-05 ENCOUNTER — INPATIENT HOSPITAL (OUTPATIENT)
Dept: URBAN - METROPOLITAN AREA HOSPITAL 130 | Facility: HOSPITAL | Age: 78
End: 2023-09-05
Payer: COMMERCIAL

## 2023-09-05 DIAGNOSIS — E55.9 VITAMIN D DEFICIENCY, UNSPECIFIED: ICD-10-CM

## 2023-09-05 DIAGNOSIS — K92.1 MELENA: ICD-10-CM

## 2023-09-05 DIAGNOSIS — Z79.01 LONG TERM (CURRENT) USE OF ANTICOAGULANTS: ICD-10-CM

## 2023-09-05 DIAGNOSIS — D64.9 ANEMIA, UNSPECIFIED: ICD-10-CM

## 2023-09-05 DIAGNOSIS — I48.91 UNSPECIFIED ATRIAL FIBRILLATION: ICD-10-CM

## 2023-09-05 DIAGNOSIS — R11.0 NAUSEA: ICD-10-CM

## 2023-09-05 PROCEDURE — 99222 1ST HOSP IP/OBS MODERATE 55: CPT | Performed by: INTERNAL MEDICINE

## 2023-09-06 ENCOUNTER — INPATIENT HOSPITAL (OUTPATIENT)
Dept: URBAN - METROPOLITAN AREA HOSPITAL 130 | Facility: HOSPITAL | Age: 78
End: 2023-09-06
Payer: COMMERCIAL

## 2023-09-06 DIAGNOSIS — D64.9 ANEMIA, UNSPECIFIED: ICD-10-CM

## 2023-09-06 DIAGNOSIS — K92.1 MELENA: ICD-10-CM

## 2023-09-06 DIAGNOSIS — K31.89 OTHER DISEASES OF STOMACH AND DUODENUM: ICD-10-CM

## 2023-09-06 DIAGNOSIS — K44.9 DIAPHRAGMATIC HERNIA WITHOUT OBSTRUCTION OR GANGRENE: ICD-10-CM

## 2023-09-06 DIAGNOSIS — I48.91 UNSPECIFIED ATRIAL FIBRILLATION: ICD-10-CM

## 2023-09-06 DIAGNOSIS — Z79.01 LONG TERM (CURRENT) USE OF ANTICOAGULANTS: ICD-10-CM

## 2023-09-06 PROCEDURE — 43239 EGD BIOPSY SINGLE/MULTIPLE: CPT | Performed by: INTERNAL MEDICINE

## 2023-09-07 ENCOUNTER — INPATIENT HOSPITAL (OUTPATIENT)
Dept: URBAN - METROPOLITAN AREA HOSPITAL 130 | Facility: HOSPITAL | Age: 78
End: 2023-09-07
Payer: COMMERCIAL

## 2023-09-07 DIAGNOSIS — I48.91 UNSPECIFIED ATRIAL FIBRILLATION: ICD-10-CM

## 2023-09-07 DIAGNOSIS — K92.1 MELENA: ICD-10-CM

## 2023-09-07 DIAGNOSIS — Z79.01 LONG TERM (CURRENT) USE OF ANTICOAGULANTS: ICD-10-CM

## 2023-09-07 DIAGNOSIS — D64.9 ANEMIA, UNSPECIFIED: ICD-10-CM

## 2023-09-07 PROCEDURE — 99232 SBSQ HOSP IP/OBS MODERATE 35: CPT | Performed by: INTERNAL MEDICINE

## 2023-09-21 ENCOUNTER — OFFICE (OUTPATIENT)
Dept: URBAN - METROPOLITAN AREA CLINIC 11 | Facility: CLINIC | Age: 78
End: 2023-09-21
Payer: COMMERCIAL

## 2023-09-21 VITALS
SYSTOLIC BLOOD PRESSURE: 145 MMHG | DIASTOLIC BLOOD PRESSURE: 78 MMHG | HEART RATE: 65 BPM | WEIGHT: 166.6 LBS | HEIGHT: 62 IN | OXYGEN SATURATION: 96 %

## 2023-09-21 DIAGNOSIS — D50.0 IRON DEFICIENCY ANEMIA SECONDARY TO BLOOD LOSS (CHRONIC): ICD-10-CM

## 2023-09-21 PROCEDURE — 99214 OFFICE O/P EST MOD 30 MIN: CPT | Performed by: INTERNAL MEDICINE

## 2023-10-16 ENCOUNTER — OFFICE (OUTPATIENT)
Dept: URBAN - METROPOLITAN AREA CLINIC 11 | Facility: CLINIC | Age: 78
End: 2023-10-16

## 2023-11-28 ENCOUNTER — OFFICE (OUTPATIENT)
Dept: URBAN - METROPOLITAN AREA CLINIC 11 | Facility: CLINIC | Age: 78
End: 2023-11-28
Payer: COMMERCIAL

## 2023-11-28 VITALS
HEART RATE: 79 BPM | OXYGEN SATURATION: 97 % | HEIGHT: 62 IN | WEIGHT: 169 LBS | SYSTOLIC BLOOD PRESSURE: 134 MMHG | DIASTOLIC BLOOD PRESSURE: 65 MMHG

## 2023-11-28 DIAGNOSIS — D50.0 IRON DEFICIENCY ANEMIA SECONDARY TO BLOOD LOSS (CHRONIC): ICD-10-CM

## 2023-11-28 DIAGNOSIS — K59.00 CONSTIPATION, UNSPECIFIED: ICD-10-CM

## 2023-11-28 PROCEDURE — 99214 OFFICE O/P EST MOD 30 MIN: CPT | Performed by: INTERNAL MEDICINE

## 2023-11-28 RX ORDER — POLYETHYLENE GLYCOL 3350 17 G/17G
POWDER, FOR SOLUTION ORAL
Qty: 1 | Refills: 11 | Status: COMPLETED
Start: 2023-11-28 | End: 2024-04-12

## 2023-11-29 LAB
CBC, PLATELET, NO DIFFERENTIAL: HEMATOCRIT: 35.5 % (ref 34–46.6)
CBC, PLATELET, NO DIFFERENTIAL: HEMOGLOBIN: 10.9 G/DL — LOW (ref 11.1–15.9)
CBC, PLATELET, NO DIFFERENTIAL: MCH: 22.9 PG — LOW (ref 26.6–33)
CBC, PLATELET, NO DIFFERENTIAL: MCHC: 30.7 G/DL — LOW (ref 31.5–35.7)
CBC, PLATELET, NO DIFFERENTIAL: MCV: 75 FL — LOW (ref 79–97)
CBC, PLATELET, NO DIFFERENTIAL: NRBC: (no result)
CBC, PLATELET, NO DIFFERENTIAL: PLATELETS: 237 X10E3/UL (ref 150–450)
CBC, PLATELET, NO DIFFERENTIAL: RBC: 4.75 X10E6/UL (ref 3.77–5.28)
CBC, PLATELET, NO DIFFERENTIAL: RDW: 17.1 % — HIGH (ref 11.7–15.4)
CBC, PLATELET, NO DIFFERENTIAL: WBC: 6.8 X10E3/UL (ref 3.4–10.8)

## 2023-12-11 ENCOUNTER — OFFICE (OUTPATIENT)
Dept: URBAN - METROPOLITAN AREA CLINIC 11 | Facility: CLINIC | Age: 78
End: 2023-12-11
Payer: COMMERCIAL

## 2023-12-11 DIAGNOSIS — D50.9 IRON DEFICIENCY ANEMIA, UNSPECIFIED: ICD-10-CM

## 2023-12-11 PROCEDURE — 91110 GI TRC IMG INTRAL ESOPH-ILE: CPT | Performed by: INTERNAL MEDICINE

## 2024-04-12 ENCOUNTER — OFFICE (OUTPATIENT)
Dept: URBAN - METROPOLITAN AREA CLINIC 11 | Facility: CLINIC | Age: 79
End: 2024-04-12
Payer: COMMERCIAL

## 2024-04-12 VITALS
HEIGHT: 62 IN | WEIGHT: 159 LBS | SYSTOLIC BLOOD PRESSURE: 131 MMHG | OXYGEN SATURATION: 96 % | DIASTOLIC BLOOD PRESSURE: 63 MMHG | HEART RATE: 64 BPM

## 2024-04-12 DIAGNOSIS — K21.9 GASTRO-ESOPHAGEAL REFLUX DISEASE WITHOUT ESOPHAGITIS: ICD-10-CM

## 2024-04-12 DIAGNOSIS — E78.70 DISORDER OF BILE ACID AND CHOLESTEROL METABOLISM, UNSPECIFIE: ICD-10-CM

## 2024-04-12 PROCEDURE — 99214 OFFICE O/P EST MOD 30 MIN: CPT | Performed by: INTERNAL MEDICINE

## 2024-04-12 RX ORDER — COLESTIPOL HYDROCHLORIDE 1 G/1
TABLET ORAL
Qty: 90 | Refills: 3 | Status: ACTIVE

## 2024-04-12 RX ORDER — PANTOPRAZOLE 40 MG/1
80 TABLET, DELAYED RELEASE ORAL
Qty: 180 | Refills: 3 | Status: ACTIVE

## 2025-02-18 ENCOUNTER — OFFICE (OUTPATIENT)
Dept: URBAN - METROPOLITAN AREA CLINIC 11 | Facility: CLINIC | Age: 80
End: 2025-02-18
Payer: COMMERCIAL

## 2025-02-18 VITALS
SYSTOLIC BLOOD PRESSURE: 116 MMHG | WEIGHT: 153 LBS | OXYGEN SATURATION: 97 % | DIASTOLIC BLOOD PRESSURE: 65 MMHG | HEART RATE: 64 BPM | HEIGHT: 62 IN

## 2025-02-18 DIAGNOSIS — R19.7 DIARRHEA, UNSPECIFIED: ICD-10-CM

## 2025-02-18 PROCEDURE — 99214 OFFICE O/P EST MOD 30 MIN: CPT | Performed by: INTERNAL MEDICINE
